# Patient Record
Sex: MALE | Race: WHITE | NOT HISPANIC OR LATINO | URBAN - METROPOLITAN AREA
[De-identification: names, ages, dates, MRNs, and addresses within clinical notes are randomized per-mention and may not be internally consistent; named-entity substitution may affect disease eponyms.]

---

## 2024-10-23 NOTE — TELEPHONE ENCOUNTER
Caller: Jayde Padron    Relationship: Emergency Contact    Best call back number: [phone]     Requested Prescriptions:   Requested Prescriptions     Pending Prescriptions Disp Refills    predniSONE (DELTASONE) 1 MG tablet       Sig: Take 1 tablet by mouth Daily. 3 TAB DAILY W/FOOD AS DIRECTED        Pharmacy where request should be sent: [facility] [address] [place] [address] [phone]  - [phone] FX     Last office visit with prescribing clinician: Visit date not found   Last telemedicine visit with prescribing clinician: Visit date not found   Next office visit with prescribing clinician: 12/12/2024     Additional details provided by patient: WILL BE OUT WITHIN THE WEEK. HAS SCHEDULED F/U WITH [facility].     Does the patient have less than a 3 day supply:  [] Yes  [x] No    Would you like a call back once the refill request has been completed: [] Yes [x] No    Ursula Melton Rep   10/23/24 13:04 EDT

## 2024-10-24 RX ORDER — PREDNISONE 1 MG/1
3 TABLET ORAL DAILY
Qty: 270 TABLET | Refills: 0 | Status: SHIPPED | OUTPATIENT
Start: 2024-10-24

## 2024-12-04 ENCOUNTER — TELEPHONE (OUTPATIENT)
Age: 64
End: 2024-12-04
Payer: COMMERCIAL

## 2024-12-04 NOTE — TELEPHONE ENCOUNTER
Caller: Jayde Padron    Relationship to patient: Emergency Contact    Best call back number: [phone]    Patient is needing: PT MISPLACED HIS PAPERWORK TO GET HIS LABS DONE AND NEEDS TO GET IT BEFORE HIS APPT NEXT THURSDAY. HIS WIFE ASKED IF IT COULD BE EMAILED TO HIM, IF NOT. BY MAIL.

## 2024-12-05 NOTE — TELEPHONE ENCOUNTER
Called and notified patient's wife, the patient will need to be seen in order to receive lab order (per [facility], best to wait until appt in case the provider need to add any add'tl labs). Patient's wife seem dissatisfied and stated Dr. Olivier always order labs prior to next appt. Expressed to patient's wife the patient's appts were cancelled on a total of three times. She stated one out of the three appts we've cancelled on our behalf.

## 2024-12-10 PROBLEM — M54.50 CHRONIC LOW BACK PAIN: Status: ACTIVE | Noted: 2024-12-10

## 2024-12-10 PROBLEM — Z79.899 IMMUNODEFICIENCY DUE TO TREATMENT WITH IMMUNOSUPPRESSIVE MEDICATION: Status: ACTIVE | Noted: 2024-12-10

## 2024-12-10 PROBLEM — Z79.60 IMMUNODEFICIENCY DUE TO TREATMENT WITH IMMUNOSUPPRESSIVE MEDICATION: Status: ACTIVE | Noted: 2024-12-10

## 2024-12-10 PROBLEM — L40.50 PSORIATIC ARTHRITIS: Status: ACTIVE | Noted: 2024-12-10

## 2024-12-10 PROBLEM — Z79.52 LONG TERM CURRENT USE OF SYSTEMIC STEROIDS: Status: ACTIVE | Noted: 2024-12-10

## 2024-12-10 PROBLEM — M54.2 NECK PAIN: Status: ACTIVE | Noted: 2024-12-10

## 2024-12-10 PROBLEM — G89.29 CHRONIC PAIN OF BOTH SHOULDERS: Status: ACTIVE | Noted: 2024-12-10

## 2024-12-10 PROBLEM — M25.511 CHRONIC PAIN OF BOTH SHOULDERS: Status: ACTIVE | Noted: 2024-12-10

## 2024-12-10 PROBLEM — G89.29 CHRONIC LOW BACK PAIN: Status: ACTIVE | Noted: 2024-12-10

## 2024-12-10 PROBLEM — T45.1X5A IMMUNODEFICIENCY DUE TO TREATMENT WITH IMMUNOSUPPRESSIVE MEDICATION: Status: ACTIVE | Noted: 2024-12-10

## 2024-12-10 PROBLEM — M25.512 CHRONIC PAIN OF BOTH SHOULDERS: Status: ACTIVE | Noted: 2024-12-10

## 2024-12-10 PROBLEM — D84.821 IMMUNODEFICIENCY DUE TO TREATMENT WITH IMMUNOSUPPRESSIVE MEDICATION: Status: ACTIVE | Noted: 2024-12-10

## 2024-12-10 NOTE — PROGRESS NOTES
Office Visit       Date: 12/12/2024   Patient Name: Georeg Padron  MRN: [id number]  YOB: 1960    Referring Physician: No ref. provider found     Chief Complaint:   Chief Complaint   Patient presents with    Psoriatic Arthritis       History of Present Illness: George Padron is a 64 y.o. male who is here today for follow-up of psoriatic arthritis.    He was last seen in clinic 2/15/2024 by myself.    He has been off Humira since 6/1/23. He has been feeling more poorly for the last 5-6 weeks. He reports he feels like he did when he was first diagnosed with his PSA. He is having more pain in his toes and feet. He reports sensation of swelling in the balls of his right foot, but no overt swelling to the eye. No dactylitis.  He is hesitant to start back on DMARD or biologic therapy.  Continues on 3 mg of prednisone.   No current skin lesions.   He takes naproxen PRN, typically just once daily.  He is walking 3-5 miles daily.   He has chronic neck and back pain that is degenerative in nature. He has tried PT and injections. He was told he could try cervical surgery but has declined.    Subjective   Review of systems:  Positive for back pain, neck pain and stiffness, sleep disturbance.  Otherwise negative ROS.    Past Medical History:   Past Medical History:   Diagnosis Date    Psoriasis        Past Surgical History:   Past Surgical History:   Procedure Laterality Date    APPENDECTOMY      CATARACT EXTRACTION, BILATERAL         Family History:   Family History   Problem Relation Age of Onset    Breast cancer Mother     Heart disease Father     Lymphoma Father     Breast cancer Sister        Social History:   Social History     Socioeconomic History    Marital status:    Tobacco Use    Smoking status: Never    Smokeless tobacco: Never   Vaping Use    Vaping status: Never Used   Substance and Sexual Activity    Alcohol use: Yes     Comment: OCC    Drug use: Defer    Sexual activity:  "Defer       Medications:   Current Outpatient Medications:     BIOTIN PO, Take 1 capsule by mouth Daily., Disp: , Rfl:     Cholecalciferol (Vitamin D3) 50 MCG (2000 UT) tablet, Take 1 tablet by mouth Daily., Disp: , Rfl:     famotidine (PEPCID) 40 MG tablet, Take 1 tablet by mouth At Night As Needed for Heartburn., Disp: , Rfl:     MILK THISTLE PO, Take 1 capsule by mouth Daily., Disp: , Rfl:     Omeprazole Magnesium (PRILOSEC PO), Take 20 mg by mouth Daily., Disp: , Rfl:     predniSONE (DELTASONE) 1 MG tablet, Take 3 tablets by mouth Daily., Disp: 270 tablet, Rfl: 0    celecoxib (CeleBREX) 200 MG capsule, Take 1 capsule by mouth 2 (Two) Times a Day., Disp: 180 capsule, Rfl: 1    Allergies:   Allergies   Allergen Reactions    Codeine Provider Review Needed       I reviewed the patient's chief complaint, history of present illness, review of systems, past medical history, surgical history, family history, social history, medications and allergy list.     Objective    Vital Signs:   Vitals:    12/12/24 1403   BP: 152/80   Pulse: 68   Temp: 98.7 °F (37.1 °C)   Weight: 94.4 kg (208 lb 3.2 oz)   Height: 182.9 cm (72\")   PainSc:   5   PainLoc: Foot  Comment: bilateral.     Body mass index is 28.24 kg/m².   Defer to PCP    Physical Exam:  Physical Exam  Constitutional:       Appearance: Normal appearance.   HENT:      Head: Normocephalic and atraumatic.   Eyes:      Conjunctiva/sclera: Conjunctivae normal.      Pupils: Pupils are equal, round, and reactive to light.   Cardiovascular:      Rate and Rhythm: Normal rate and regular rhythm.      Heart sounds: Normal heart sounds.   Pulmonary:      Effort: Pulmonary effort is normal.      Breath sounds: Normal breath sounds.   Musculoskeletal:         General: Normal range of motion.      Cervical back: Normal range of motion.   Skin:     General: Skin is warm and dry.   Neurological:      General: No focal deficit present.      Mental Status: He is alert and oriented to " person, place, and time.   Psychiatric:         Mood and Affect: Mood normal.         Behavior: Behavior normal.         Thought Content: Thought content normal.         Judgment: Judgment normal.              Metrics  BOONE-28 (ESR): --  BOONE-28 (CRP): --  Tender (BOONE-28): 0 / 28   Swollen (BOONE-28): 0 / 28     This Exam  Provider Global: 30 / 100  Patient Global: 60 / 100  ESR: --  CRP: --          Assessment / Plan    Assessment/Plan:   Diagnoses and all orders for this visit:    1. Psoriatic arthritis (Primary)  Assessment & Plan:  Onset of pain 2010.  RF, CCP, NAUN, and HLA B27 negative. Leflunomide no help. Sulfasalazine 2010.  Sulfasalazine was discontinued and methotrexate started 2010. Enbrel added 2017. Chronic steroid therapy.  First seen at [facility] 2017.   Humira started 2018 after stopping Enbrel. MTX stopped 2019. Humira stopped 6/2023 (cost).    PsA seems to be doing worse. Having 2-3 hours a day of morning stiffness.  Having pain in his right MTPs.  Tender joint count is 0, swollen joint count is 0 , physician global is 3, visual analog pain scale is 5, pt global is 6  On examination, he demonstrates no joint swelling, enthesitis, or dactylitis.  He stopped Humira 6/2023 due to cost and has been off medication for PSA since that time.  He would like to continue to monitor off biologics/DMARDs for now.  He has currently been taking naproxen once daily. We will try stopping naproxen and trial Celebrex BID PRN.  Continue 3 mg of prednisone.   If he is having persistent MTP pain, we did discuss adding medication for his psoriatic at his next visit.  Labs today  RTC in 3-4 months    Orders:  -     predniSONE (DELTASONE) 1 MG tablet; Take 3 tablets by mouth Daily.  Dispense: 270 tablet; Refill: 0  -     Comprehensive Metabolic Panel; Future  -     C-reactive Protein; Future  -     Sedimentation Rate; Future  -     CBC Auto Differential; Future  -     Hepatitis Panel, Acute; Future  -     QuantiFERON-TB Gold Plus;  Future  -     celecoxib (CeleBREX) 200 MG capsule; Take 1 capsule by mouth 2 (Two) Times a Day.  Dispense: 180 capsule; Refill: 1    2. 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 Off Humira since 6/2023    Orders:  -     Hepatitis Panel, Acute; Future  -     QuantiFERON-TB Gold Plus; Future    4. Long term current use of systemic steroids  Assessment & Plan:  Prednisone 10 mg q. a.m. started 2010.     Has since weaned to 3 mg daily which he continues    Orders:  -     predniSONE (DELTASONE) 1 MG tablet; Take 3 tablets by mouth Daily.  Dispense: 270 tablet; Refill: 0  -     Comprehensive Metabolic Panel; Future  -     C-reactive Protein; Future  -     Sedimentation Rate; Future  -     CBC Auto Differential; Future    5. Chronic pain of both shoulders  Assessment & Plan:  He has a known history of rotator cuff tear bilaterally. NCS shows cervical radiculopathy. PT helped. I think neck pain is associated with DDD. Injections gave some relief but short term only and symptoms come and go    Orders:  -     celecoxib (CeleBREX) 200 MG capsule; Take 1 capsule by mouth 2 (Two) Times a Day.  Dispense: 180 capsule; Refill: 1    6. Chronic low back pain, unspecified back pain laterality, unspecified whether sciatica present  Assessment & Plan:  Recommend discuss medication options with PCP, such as muscle relaxer, gabapentin  PT/stretching has been helpful in past  Pain in LS area on R, R groin, and down to foot with paresthesia. Worse with back movement.   Injection gave some relief. Stretching can relieve it.    [place] does [place] in [place] when he was not working.  Symptoms still come and go    Orders:  -     celecoxib (CeleBREX) 200 MG capsule; Take 1 capsule by mouth 2 (Two) Times a Day.   Dispense: 180 capsule; Refill: 1    7. Neck pain  Assessment & Plan:  Comes and goes. Known DDD with cervical radiculitis in the past. PT and injections have not helped much. He declined surgical options discussed with NS    Orders:  -     celecoxib (CeleBREX) 200 MG capsule; Take 1 capsule by mouth 2 (Two) Times a Day.  Dispense: 180 capsule; Refill: 1    8. Encounter for medication monitoring  Assessment & Plan:  Update hepatitis panel    Orders:  -     Comprehensive Metabolic Panel; Future  -     C-reactive Protein; Future  -     Sedimentation Rate; Future  -     CBC Auto Differential; Future  -     Hepatitis Panel, Acute; Future  -     QuantiFERON-TB Gold Plus; Future    9. Fatigue, unspecified type  Assessment & Plan:  Update QTB    Orders:  -     Hepatitis Panel, Acute; Future  -     QuantiFERON-TB Gold Plus; Future        Follow Up:   Return in about 4 months (around 4/12/2025) for Dr. Olivier.        UMA Valencia  [facility] [place]

## 2024-12-10 NOTE — ASSESSMENT & PLAN NOTE
Enbrel therapy started February 2017-stopped 2018  Humira started 2018- stopped on own 6/2023    No significant infections in interim. Off Humira since 6/2023

## 2024-12-10 NOTE — ASSESSMENT & PLAN NOTE
Recommend discuss medication options with PCP, such as muscle relaxer, gabapentin  PT/stretching has been helpful in past  Pain in LS area on R, R groin, and down to foot with paresthesia. Worse with back movement.   Injection gave some relief. Stretching can relieve it.    [place] does [place] in [place] when he was not working.  Symptoms still come and go   family/patient

## 2024-12-10 NOTE — ASSESSMENT & PLAN NOTE
Comes and goes. Known DDD with cervical radiculitis in the past. PT and injections have not helped much. He declined surgical options discussed with NS   Retinal tear and detachment warning symptoms reviewed and patient instructed to call immediately if increasing floaters, flashes, or decreasing peripheral vision.

## 2024-12-10 NOTE — ASSESSMENT & PLAN NOTE
He has a known history of rotator cuff tear bilaterally. NCS shows cervical radiculopathy. PT helped. I think neck pain is associated with DDD. Injections gave some relief but short term only and symptoms come and go

## 2024-12-10 NOTE — ASSESSMENT & PLAN NOTE
Onset of pain 2010.  RF, CCP, NAUN, and HLA B27 negative. Leflunomide no help. Sulfasalazine 2010.  Sulfasalazine was discontinued and methotrexate started 2010. Enbrel added 2017. Chronic steroid therapy.  First seen at [facility] 2017.   Humira started 2018 after stopping Enbrel. MTX stopped 2019. Humira stopped 6/2023 (cost).    PsA seems to be doing worse. Having 2-3 hours a day of morning stiffness.  Having pain in his right MTPs.  Tender joint count is 0, swollen joint count is 0 , physician global is 3, visual analog pain scale is 5, pt global is 6  On examination, he demonstrates no joint swelling, enthesitis, or dactylitis.  He stopped Humira 6/2023 due to cost and has been off medication for PSA since that time.  He would like to continue to monitor off biologics/DMARDs for now.  He has currently been taking naproxen once daily. We will try stopping naproxen and trial Celebrex BID PRN.  Continue 3 mg of prednisone.   If he is having persistent MTP pain, we did discuss adding medication for his psoriatic at his next visit.  Labs today  RTC in 3-4 months

## 2024-12-12 ENCOUNTER — LAB (OUTPATIENT)
Facility: HOSPITAL | Age: 64
End: 2024-12-12
Payer: MEDICARE

## 2024-12-12 ENCOUNTER — OFFICE VISIT (OUTPATIENT)
Age: 64
End: 2024-12-12
Payer: MEDICARE

## 2024-12-12 VITALS
TEMPERATURE: 98.7 F | SYSTOLIC BLOOD PRESSURE: 152 MMHG | BODY MASS INDEX: 28.2 KG/M2 | HEART RATE: 68 BPM | WEIGHT: 208.2 LBS | DIASTOLIC BLOOD PRESSURE: 80 MMHG | HEIGHT: 72 IN

## 2024-12-12 DIAGNOSIS — G89.29 CHRONIC LOW BACK PAIN, UNSPECIFIED BACK PAIN LATERALITY, UNSPECIFIED WHETHER SCIATICA PRESENT: ICD-10-CM

## 2024-12-12 DIAGNOSIS — L40.50 PSORIATIC ARTHRITIS: Primary | ICD-10-CM

## 2024-12-12 DIAGNOSIS — R53.83 FATIGUE, UNSPECIFIED TYPE: ICD-10-CM

## 2024-12-12 DIAGNOSIS — Z79.899 IMMUNODEFICIENCY DUE TO TREATMENT WITH IMMUNOSUPPRESSIVE MEDICATION: ICD-10-CM

## 2024-12-12 DIAGNOSIS — D84.821 IMMUNODEFICIENCY DUE TO TREATMENT WITH IMMUNOSUPPRESSIVE MEDICATION: ICD-10-CM

## 2024-12-12 DIAGNOSIS — L40.9 PSORIASIS: ICD-10-CM

## 2024-12-12 DIAGNOSIS — G89.29 CHRONIC PAIN OF BOTH SHOULDERS: ICD-10-CM

## 2024-12-12 DIAGNOSIS — M25.512 CHRONIC PAIN OF BOTH SHOULDERS: ICD-10-CM

## 2024-12-12 DIAGNOSIS — L40.50 PSORIATIC ARTHRITIS: ICD-10-CM

## 2024-12-12 DIAGNOSIS — M54.50 CHRONIC LOW BACK PAIN, UNSPECIFIED BACK PAIN LATERALITY, UNSPECIFIED WHETHER SCIATICA PRESENT: ICD-10-CM

## 2024-12-12 DIAGNOSIS — Z79.52 LONG TERM CURRENT USE OF SYSTEMIC STEROIDS: ICD-10-CM

## 2024-12-12 DIAGNOSIS — Z51.81 ENCOUNTER FOR MEDICATION MONITORING: ICD-10-CM

## 2024-12-12 DIAGNOSIS — M25.511 CHRONIC PAIN OF BOTH SHOULDERS: ICD-10-CM

## 2024-12-12 DIAGNOSIS — M54.2 NECK PAIN: ICD-10-CM

## 2024-12-12 LAB
BASOPHILS # BLD AUTO: 0.04 10*3/MM3 (ref 0–0.2)
BASOPHILS NFR BLD AUTO: 0.5 % (ref 0–1.5)
DEPRECATED RDW RBC AUTO: 39.2 FL (ref 37–54)
EOSINOPHIL # BLD AUTO: 0.04 10*3/MM3 (ref 0–0.4)
EOSINOPHIL NFR BLD AUTO: 0.5 % (ref 0.3–6.2)
ERYTHROCYTE [DISTWIDTH] IN BLOOD BY AUTOMATED COUNT: 12.1 % (ref 12.3–15.4)
ERYTHROCYTE [SEDIMENTATION RATE] IN BLOOD: 7 MM/HR (ref 0–20)
HCT VFR BLD AUTO: 46.1 % (ref 37.5–51)
HGB BLD-MCNC: 16.2 G/DL (ref 13–17.7)
IMM GRANULOCYTES # BLD AUTO: 0.02 10*3/MM3 (ref 0–0.05)
IMM GRANULOCYTES NFR BLD AUTO: 0.3 % (ref 0–0.5)
LYMPHOCYTES # BLD AUTO: 2.02 10*3/MM3 (ref 0.7–3.1)
LYMPHOCYTES NFR BLD AUTO: 26 % (ref 19.6–45.3)
MCH RBC QN AUTO: 31.4 PG (ref 26.6–33)
MCHC RBC AUTO-ENTMCNC: 35.1 G/DL (ref 31.5–35.7)
MCV RBC AUTO: 89.3 FL (ref 79–97)
MONOCYTES # BLD AUTO: 0.47 10*3/MM3 (ref 0.1–0.9)
MONOCYTES NFR BLD AUTO: 6 % (ref 5–12)
NEUTROPHILS NFR BLD AUTO: 5.19 10*3/MM3 (ref 1.7–7)
NEUTROPHILS NFR BLD AUTO: 66.7 % (ref 42.7–76)
NRBC BLD AUTO-RTO: 0 /100 WBC (ref 0–0.2)
PLATELET # BLD AUTO: 251 10*3/MM3 (ref 140–450)
PMV BLD AUTO: 9.8 FL (ref 6–12)
RBC # BLD AUTO: 5.16 10*6/MM3 (ref 4.14–5.8)
WBC NRBC COR # BLD AUTO: 7.78 10*3/MM3 (ref 3.4–10.8)

## 2024-12-12 PROCEDURE — 86480 TB TEST CELL IMMUN MEASURE: CPT

## 2024-12-12 PROCEDURE — 86140 C-REACTIVE PROTEIN: CPT

## 2024-12-12 PROCEDURE — 80074 ACUTE HEPATITIS PANEL: CPT

## 2024-12-12 PROCEDURE — 36415 COLL VENOUS BLD VENIPUNCTURE: CPT

## 2024-12-12 PROCEDURE — 85025 COMPLETE CBC W/AUTO DIFF WBC: CPT

## 2024-12-12 PROCEDURE — 85652 RBC SED RATE AUTOMATED: CPT

## 2024-12-12 PROCEDURE — 80053 COMPREHEN METABOLIC PANEL: CPT

## 2024-12-12 RX ORDER — PREDNISONE 1 MG/1
3 TABLET ORAL DAILY
Qty: 270 TABLET | Refills: 0 | Status: SHIPPED | OUTPATIENT
Start: 2024-12-12

## 2024-12-12 RX ORDER — CELECOXIB 200 MG/1
200 CAPSULE ORAL 2 TIMES DAILY
Qty: 180 CAPSULE | Refills: 1 | Status: SHIPPED | OUTPATIENT
Start: 2024-12-12

## 2024-12-12 RX ORDER — NAPROXEN 500 MG/1
500 TABLET ORAL 2 TIMES DAILY WITH MEALS
Qty: 60 TABLET | Refills: 3 | Status: CANCELLED | OUTPATIENT
Start: 2024-12-12

## 2024-12-12 RX ORDER — ADALIMUMAB 40MG/0.8ML
KIT SUBCUTANEOUS
COMMUNITY
End: 2024-12-12

## 2024-12-13 LAB
ALBUMIN SERPL-MCNC: 4.5 G/DL (ref 3.5–5.2)
ALBUMIN/GLOB SERPL: 1.6 G/DL
ALP SERPL-CCNC: 94 U/L (ref 39–117)
ALT SERPL W P-5'-P-CCNC: 39 U/L (ref 1–41)
ANION GAP SERPL CALCULATED.3IONS-SCNC: 11 MMOL/L (ref 5–15)
AST SERPL-CCNC: 31 U/L (ref 1–40)
BILIRUB SERPL-MCNC: 0.7 MG/DL (ref 0–1.2)
BUN SERPL-MCNC: 19 MG/DL (ref 8–23)
BUN/CREAT SERPL: 20.2 (ref 7–25)
CALCIUM SPEC-SCNC: 10 MG/DL (ref 8.6–10.5)
CHLORIDE SERPL-SCNC: 104 MMOL/L (ref 98–107)
CO2 SERPL-SCNC: 26 MMOL/L (ref 22–29)
CREAT SERPL-MCNC: 0.94 MG/DL (ref 0.76–1.27)
CRP SERPL-MCNC: 0.48 MG/DL (ref 0–0.5)
EGFRCR SERPLBLD CKD-EPI 2021: 90 ML/MIN/1.73
GLOBULIN UR ELPH-MCNC: 2.9 GM/DL
GLUCOSE SERPL-MCNC: 121 MG/DL (ref 65–99)
HAV IGM SERPL QL IA: NORMAL
HBV CORE IGM SERPL QL IA: NORMAL
HBV SURFACE AG SERPL QL IA: NORMAL
HCV AB SER QL: NORMAL
POTASSIUM SERPL-SCNC: 4 MMOL/L (ref 3.5–5.2)
PROT SERPL-MCNC: 7.4 G/DL (ref 6–8.5)
SODIUM SERPL-SCNC: 141 MMOL/L (ref 136–145)

## 2024-12-17 LAB
GAMMA INTERFERON BACKGROUND BLD IA-ACNC: 0.02 IU/ML
M TB IFN-G BLD-IMP: NEGATIVE
M TB IFN-G CD4+ BCKGRND COR BLD-ACNC: 0.03 IU/ML
M TB IFN-G CD4+CD8+ BCKGRND COR BLD-ACNC: 0.02 IU/ML
MITOGEN IGNF BCKGRD COR BLD-ACNC: >10 IU/ML
QUANTIFERON INCUBATION: NORMAL
SERVICE CMNT-IMP: NORMAL

## 2025-03-10 NOTE — PROGRESS NOTES
Office Follow Up      Date: 03/11/2025   Patient Name: George Padron  MRN: [id number]  YOB: 1960    Referring Physician: No ref. provider found     Chief Complaint: Psoriatic arthritis      History of Present Illness: George Padron is a 64 y.o. male who is here today for follow up on psoriatic arthritis  Since October 2024, he has increased pain diffusely. More pain in ribs and spine. Pain in L 3rd MCP. .  Feet feel bruised. Some burning sensation in feet. Having numbness in L foot.   Continues on 3 mg of prednisone. No current skin lesions. He takes naproxen PRN, typically just once daily.  He has known cervical radicular disease.     He has been off Humira since 6/1/23. He feels well off the medication and would like to continue off biologics/DMARDs for now and monitor. No infections. No joint swelling/tenderness. Most of his pain is from his neck and back and is degenerative in nature. He has tried PT and injections. He was told he could try cervical surgery but has declined.  Pain scale: 9/10. EMS is all day.   The symptoms are constant. The problem is improving. The primary symptoms reported include: stiffness. The following symptoms are not reported:  pain, swelling and functional limitation. The patient's assessment of treatment is: helping greatly. The patient is not experiencing side effects. The locations affected since last visit are neck, low back and bilateral shoulder. Associated symptoms include AM stiffness (all day) and inactivity gelling. Pertinent negatives include fever, fatigue, change in vision, sicca complaints, mouth sores/lesions, skin lesion(s), chest pain, changing cough, edema, joint swelling and abdominal pain.      Subjective   Review of Systems: Review of Systems   Musculoskeletal:  Positive for arthralgias, back pain, gait problem, joint swelling, myalgias, neck pain and neck stiffness.        Medications:   Current Outpatient Medications:     BIOTIN  "PO, Take 1 capsule by mouth Daily., Disp: , Rfl:     Cholecalciferol (Vitamin D3) 50 MCG (2000 UT) tablet, Take 1 tablet by mouth Daily., Disp: , Rfl:     famotidine (PEPCID) 40 MG tablet, Take 1 tablet by mouth At Night As Needed for Heartburn., Disp: , Rfl:     MILK THISTLE PO, Take 1 capsule by mouth Daily., Disp: , Rfl:     Omeprazole Magnesium (PRILOSEC PO), Take 20 mg by mouth Daily., Disp: , Rfl:     predniSONE (DELTASONE) 1 MG tablet, Take 3 tablets by mouth Daily., Disp: 270 tablet, Rfl: 0    naproxen (Naprosyn) 500 MG tablet, Take 1 tablet by mouth 2 (Two) Times a Day With Meals. Take 1 tablet (500 mg) by oral route 2 times every day with food, Disp: 60 tablet, Rfl: 6    Allergies:   Allergies   Allergen Reactions    Codeine Provider Review Needed       I have reviewed and updated the patient's chief complaint, history of present illness, review of systems, past medical history, surgical history, family history, social history, medications and allergy list as appropriate.     Objective    Vital Signs:   Vitals:    03/11/25 1348   BP: 142/78   BP Location: Left arm   Patient Position: Sitting   Cuff Size: Adult   Pulse: 61   Temp: 97.8 °F (36.6 °C)   Weight: 93.2 kg (205 lb 6.4 oz)   Height: 182.9 cm (72\")   PainSc: 9    PainLoc: Generalized     Body mass index is 27.86 kg/m².    Physical Exam:  GEN.: The patient is pleasant, cooperative, well-developed and healthy-appearing.  The patient is in no obvious distress and is alert and oriented ×3.  HEENT: Normocephalic and atraumatic.  No alopecia.  No facial rash.  Sclera and conjunctiva are clear.  Pupils are equal and reactive.  Fundi are benign.  Dentition is in fair repair.  There are no oral or nasal lesions seen.  NECK: Supple without adenopathy or thyromegaly.  No masses are felt.  LUNGS: Clear to auscultation and percussion.  Effort is normal.  CARDIAC: Normal S1 and S2 with a regular rate and rhythm without murmur, gallop, or rub.  ABDOMEN: Not " examined.  EXTREMITIES: There is no cyanosis, clubbing, or edema.   SKIN: Warm and dry without rashes, telangiectasias, vasculitic lesions, psoriatic lesions, nail pits, or nodules.  JOINTS: There is full range of motion of the shoulders, elbows, wrists, and hands without soft tissue swelling, synovitis, or deformities.  There is no soft tissue swelling or synovitis in the hands.  Hips have good flexion and internal and external rotation with pain. He has back pain with full flexion of R hip and with full external rotation.   Knees have no palpable effusions.  Ankles have no soft tissue swelling, synovitis, or significant deformities.    BACK: Straight without scoliosis.  NEUROLOGIC:   Gait and station are normal.  Joint Exam 03/11/2025        Right  Left   MCP 3     Swollen Tender   MTP 2  Swollen Tender      MTP 3  Swollen Tender        The following joints were examined and normal:   Left TMJ, Right TMJ, Left Sternoclavicular, Right Sternoclavicular, Left Acromioclavicular, Right Acromioclavicular, Left Glenohumeral, Right Glenohumeral, Left Elbow, Right Elbow, Left Wrist, Right Wrist, Left MCP 1, Right MCP 1, Left MCP 2, Right MCP 2, Right MCP 3, Left MCP 4, Right MCP 4, Left MCP 5, Right MCP 5, Left IP (thumb), Right IP (thumb), Left PIP 2 (finger), Right PIP 2 (finger), Left PIP 3 (finger), Right PIP 3 (finger), Left PIP 4 (finger), Right PIP 4 (finger), Left PIP 5 (finger), Right PIP 5 (finger), Left DIP 2 (finger), Right DIP 2 (finger), Left DIP 3 (finger), Right DIP 3 (finger), Left DIP 4 (finger), Right DIP 4 (finger), Left DIP 5 (finger), Right DIP 5 (finger), Left Hip, Right Hip, Left Knee, Right Knee, Left Ankle, Right Ankle, Left Tarsometatarsal, Right Tarsometatarsal, Left MTP 1, Right MTP 1, Left MTP 2, Left MTP 3, Left MTP 4, Right MTP 4, Left MTP 5, Right MTP 5, Left IP (toe), Right IP (toe), Left PIP 2 (toe), Right PIP 2 (toe), Left PIP 3 (toe), Right PIP 3 (toe), Left PIP 4 (toe), Right PIP 4  (toe), Left PIP 5 (toe), Right PIP 5 (toe)       Patient Global: 90 / 100  Provider Global: 30 / 100      Assessment / Plan      Assessment & Plan  Psoriatic arthritis      Orders:    C-reactive Protein; Future    Sedimentation Rate; Future    Psoriasis           Immunodeficiency due to treatment with immunosuppressive medication      Orders:    XR Chest 2 View    Long term current use of systemic steroids           Chronic pain of both shoulders         Chronic low back pain, unspecified back pain laterality, unspecified whether sciatica present         Neck pain           Encounter for medication monitoring         Fatigue, unspecified type      Orders:    XR Chest 2 View    Long term (current) use of non-steroidal anti-inflammatories (nsaid)    Orders:    Comprehensive Metabolic Panel; Future    CBC Auto Differential; Future     Psoriatic arthritis  Onset of pain 2010.  RF, CCP, NAUN, and HLA B27 negative. Leflunomide no help. Sulfasalazine 2010.  Sulfasalazine was discontinued and methotrexate started 2010. Enbrel added 2017. Chronic steroid therapy.  First seen at [facility] 2017.   Humira started 2018 after stopping Enbrel. MTX stopped 2019. Humira stopped 6/2023 (cost).    PsA is worse. Having morning stiffness.  Having pain in his right MTPs and L hand with swelling.   Tender joint count is 3, swollen joint count is 3 , physician global is 3, visual analog pain scale is 9, pt global is 8  He stopped Humira 6/2023 due to cost and has been off medication for PSA since that time.  I will PA Humira.  We discussed biologic agents at length. Risks and alternative were discussed at length and the option of no treatment was also given. We discussed risks including but not limited to infections which can be unusual,  severe, and deadly. When possible, these agents should be stopped immediately if infections occur. Unusual infection such as TB and fungal infections can occur. There may be an increased risk of lymphoma with  these agents. Other risks can include are multiple sclerosis like illness and worsening of the failure. Infusion or injection reactions whish can be delay have been reported.  Reactivation of deadly brain viruses have been reported.  Worsening of COPD has been seen with Orencia.  Elevated lipids, elevations in liver functions, and dangerous changes in blood counts have been seen with certain agents.  Regular monitoring will be required.  Continue 3 mg of prednisone.   Labs today  RTC in 3 months         Psoriasis  Diagnosed by bx    Doing well         Immunodeficiency due to treatment with immunosuppressive medication  Enbrel therapy started February 2017-stopped 2018  Humira started 2018- stopped on own 6/2023    No significant infections in interim. Off Humira since 6/2023         Long term current use of systemic steroids  Prednisone 10 mg q. a.m. started 2010.     Has since weaned to 3 mg daily which he continues         Chronic pain of both shoulders  He has a known history of rotator cuff tear bilaterally. NCS shows cervical radiculopathy. PT helped. I think neck pain is associated with DDD. Injections gave some relief but short term only and symptoms come and go         Chronic low back pain, unspecified back pain laterality, unspecified whether sciatica present  Recommend discuss medication options with PCP, such as muscle relaxer, gabapentin  PT/stretching has been helpful in past  Pain in LS area on R, R groin, and down to foot with paresthesia. Worse with back movement.   Injection gave some relief. Stretching can relieve it.    Symptoms still come and go         Neck pain  Comes and goes. Known DDD with cervical radiculitis in the past. PT and injections have not helped much. He declined surgical options discussed with NS         Encounter for medication monitoring       Long term (current) use of non-steroidal anti-inflammatories (nsaid)  Risks of nonsteroidal anti-inflammatory drugs discussed including  GI upset, GI bleeding, renal and hepatic risk, and the risks of cardiovascular disease.  Warned not to take with other NSAIDs including over-the-counter NSAIDs.      Fatigue, unspecified type      Follow Up:   Return in about 3 months (around 6/11/2025).        George Olivier MD  [facility]

## 2025-03-11 ENCOUNTER — TELEPHONE (OUTPATIENT)
Age: 65
End: 2025-03-11

## 2025-03-11 ENCOUNTER — LAB (OUTPATIENT)
Facility: HOSPITAL | Age: 65
End: 2025-03-11
Payer: MEDICARE

## 2025-03-11 ENCOUNTER — OFFICE VISIT (OUTPATIENT)
Age: 65
End: 2025-03-11
Payer: MEDICARE

## 2025-03-11 VITALS
TEMPERATURE: 97.8 F | HEART RATE: 61 BPM | SYSTOLIC BLOOD PRESSURE: 142 MMHG | BODY MASS INDEX: 27.82 KG/M2 | WEIGHT: 205.4 LBS | DIASTOLIC BLOOD PRESSURE: 78 MMHG | HEIGHT: 72 IN

## 2025-03-11 DIAGNOSIS — Z79.52 LONG TERM CURRENT USE OF SYSTEMIC STEROIDS: ICD-10-CM

## 2025-03-11 DIAGNOSIS — M25.511 CHRONIC PAIN OF BOTH SHOULDERS: ICD-10-CM

## 2025-03-11 DIAGNOSIS — Z79.899 IMMUNODEFICIENCY DUE TO TREATMENT WITH IMMUNOSUPPRESSIVE MEDICATION: ICD-10-CM

## 2025-03-11 DIAGNOSIS — G89.29 CHRONIC LOW BACK PAIN, UNSPECIFIED BACK PAIN LATERALITY, UNSPECIFIED WHETHER SCIATICA PRESENT: ICD-10-CM

## 2025-03-11 DIAGNOSIS — L40.9 PSORIASIS: ICD-10-CM

## 2025-03-11 DIAGNOSIS — Z51.81 ENCOUNTER FOR MEDICATION MONITORING: ICD-10-CM

## 2025-03-11 DIAGNOSIS — R53.83 FATIGUE, UNSPECIFIED TYPE: ICD-10-CM

## 2025-03-11 DIAGNOSIS — Z79.1 LONG TERM (CURRENT) USE OF NON-STEROIDAL ANTI-INFLAMMATORIES (NSAID): ICD-10-CM

## 2025-03-11 DIAGNOSIS — D84.821 IMMUNODEFICIENCY DUE TO TREATMENT WITH IMMUNOSUPPRESSIVE MEDICATION: ICD-10-CM

## 2025-03-11 DIAGNOSIS — L40.50 PSORIATIC ARTHRITIS: Primary | ICD-10-CM

## 2025-03-11 DIAGNOSIS — M54.2 NECK PAIN: ICD-10-CM

## 2025-03-11 DIAGNOSIS — L40.50 PSORIATIC ARTHRITIS: ICD-10-CM

## 2025-03-11 DIAGNOSIS — M25.512 CHRONIC PAIN OF BOTH SHOULDERS: ICD-10-CM

## 2025-03-11 DIAGNOSIS — G89.29 CHRONIC PAIN OF BOTH SHOULDERS: ICD-10-CM

## 2025-03-11 DIAGNOSIS — M54.50 CHRONIC LOW BACK PAIN, UNSPECIFIED BACK PAIN LATERALITY, UNSPECIFIED WHETHER SCIATICA PRESENT: ICD-10-CM

## 2025-03-11 LAB
BASOPHILS # BLD AUTO: 0.04 10*3/MM3 (ref 0–0.2)
BASOPHILS NFR BLD AUTO: 0.5 % (ref 0–1.5)
DEPRECATED RDW RBC AUTO: 40.7 FL (ref 37–54)
EOSINOPHIL # BLD AUTO: 0.06 10*3/MM3 (ref 0–0.4)
EOSINOPHIL NFR BLD AUTO: 0.7 % (ref 0.3–6.2)
ERYTHROCYTE [DISTWIDTH] IN BLOOD BY AUTOMATED COUNT: 12.4 % (ref 12.3–15.4)
HCT VFR BLD AUTO: 43.6 % (ref 37.5–51)
HGB BLD-MCNC: 14.7 G/DL (ref 13–17.7)
IMM GRANULOCYTES # BLD AUTO: 0.03 10*3/MM3 (ref 0–0.05)
IMM GRANULOCYTES NFR BLD AUTO: 0.3 % (ref 0–0.5)
LYMPHOCYTES # BLD AUTO: 1.64 10*3/MM3 (ref 0.7–3.1)
LYMPHOCYTES NFR BLD AUTO: 18.8 % (ref 19.6–45.3)
MCH RBC QN AUTO: 30.4 PG (ref 26.6–33)
MCHC RBC AUTO-ENTMCNC: 33.7 G/DL (ref 31.5–35.7)
MCV RBC AUTO: 90.3 FL (ref 79–97)
MONOCYTES # BLD AUTO: 0.42 10*3/MM3 (ref 0.1–0.9)
MONOCYTES NFR BLD AUTO: 4.8 % (ref 5–12)
NEUTROPHILS NFR BLD AUTO: 6.52 10*3/MM3 (ref 1.7–7)
NEUTROPHILS NFR BLD AUTO: 74.9 % (ref 42.7–76)
NRBC BLD AUTO-RTO: 0 /100 WBC (ref 0–0.2)
PLATELET # BLD AUTO: 259 10*3/MM3 (ref 140–450)
PMV BLD AUTO: 9.8 FL (ref 6–12)
RBC # BLD AUTO: 4.83 10*6/MM3 (ref 4.14–5.8)
WBC NRBC COR # BLD AUTO: 8.71 10*3/MM3 (ref 3.4–10.8)

## 2025-03-11 PROCEDURE — 1159F MED LIST DOCD IN RCRD: CPT | Performed by: INTERNAL MEDICINE

## 2025-03-11 PROCEDURE — 85652 RBC SED RATE AUTOMATED: CPT

## 2025-03-11 PROCEDURE — 36415 COLL VENOUS BLD VENIPUNCTURE: CPT

## 2025-03-11 PROCEDURE — 80053 COMPREHEN METABOLIC PANEL: CPT

## 2025-03-11 PROCEDURE — 99214 OFFICE O/P EST MOD 30 MIN: CPT | Performed by: INTERNAL MEDICINE

## 2025-03-11 PROCEDURE — 85025 COMPLETE CBC W/AUTO DIFF WBC: CPT

## 2025-03-11 PROCEDURE — 1160F RVW MEDS BY RX/DR IN RCRD: CPT | Performed by: INTERNAL MEDICINE

## 2025-03-11 PROCEDURE — 86140 C-REACTIVE PROTEIN: CPT

## 2025-03-11 RX ORDER — NAPROXEN 500 MG/1
500 TABLET ORAL 2 TIMES DAILY WITH MEALS
Qty: 60 TABLET | Refills: 6 | Status: SHIPPED | OUTPATIENT
Start: 2025-03-11

## 2025-03-11 NOTE — TELEPHONE ENCOUNTER
Prior authorization initiated by [facility].  Update will be provided when a determination has been received.     Medication: Humira  PA Submission Method: CMM  Case Number/CMM Key: [id number]

## 2025-03-12 LAB
ALBUMIN SERPL-MCNC: 4.4 G/DL (ref 3.5–5.2)
ALBUMIN/GLOB SERPL: 1.4 G/DL
ALP SERPL-CCNC: 106 U/L (ref 39–117)
ALT SERPL W P-5'-P-CCNC: 57 U/L (ref 1–41)
ANION GAP SERPL CALCULATED.3IONS-SCNC: 10 MMOL/L (ref 5–15)
AST SERPL-CCNC: 34 U/L (ref 1–40)
BILIRUB SERPL-MCNC: 0.8 MG/DL (ref 0–1.2)
BUN SERPL-MCNC: 16 MG/DL (ref 8–23)
BUN/CREAT SERPL: 14.8 (ref 7–25)
CALCIUM SPEC-SCNC: 9.4 MG/DL (ref 8.6–10.5)
CHLORIDE SERPL-SCNC: 103 MMOL/L (ref 98–107)
CO2 SERPL-SCNC: 26 MMOL/L (ref 22–29)
CREAT SERPL-MCNC: 1.08 MG/DL (ref 0.76–1.27)
CRP SERPL-MCNC: 0.48 MG/DL (ref 0–0.5)
EGFRCR SERPLBLD CKD-EPI 2021: 76.2 ML/MIN/1.73
ERYTHROCYTE [SEDIMENTATION RATE] IN BLOOD: 12 MM/HR (ref 0–20)
GLOBULIN UR ELPH-MCNC: 3.1 GM/DL
GLUCOSE SERPL-MCNC: 103 MG/DL (ref 65–99)
POTASSIUM SERPL-SCNC: 4.4 MMOL/L (ref 3.5–5.2)
PROT SERPL-MCNC: 7.5 G/DL (ref 6–8.5)
SODIUM SERPL-SCNC: 139 MMOL/L (ref 136–145)

## 2025-03-12 NOTE — TELEPHONE ENCOUNTER
PA request has been approved and pharmacy notified (Filling pharmacy will be notified by phone, fax, or submitted prescription)    Authorized Medication: Humira  Name of Insurance Approving PA: [employer]  Pharmacy PA Number: [id number]  PA Effective Dates: 1/1/25-3/12/26  Dispensing Pharmacy: [facility] -- patient may benefit from [facility] due to deductible

## 2025-03-13 ENCOUNTER — SPECIALTY PHARMACY (OUTPATIENT)
Facility: HOSPITAL | Age: 65
End: 2025-03-13
Payer: MEDICARE

## 2025-03-13 RX ORDER — ADALIMUMAB 40MG/0.4ML
1 KIT SUBCUTANEOUS
Qty: 2 EACH | Refills: 5 | Status: SHIPPED | OUTPATIENT
Start: 2025-03-13

## 2025-03-13 NOTE — PROGRESS NOTES
Specialty Pharmacy Patient Management Program  Rheumatology Initial Assessment     George Padron was referred by an Rheumatology provider to the Rheumatology Patient Management program offered by [facility] for Psoriatic Arthritis on 03/13/25.  An initial outreach was conducted, including assessment of therapy appropriateness and specialty medication education for Humira. The patient was introduced to services offered by [facility], including: regular assessments, refill coordination, curbside pick-up or mail order delivery options, prior authorization maintenance, and financial assistance programs as applicable. The patient was also provided with contact information for the pharmacy team.     Insurance Coverage & Financial Support  [employer] Medicare     Relevant Past Medical History and Comorbidities  Relevant medical history and concomitant health conditions were discussed with the patient. The patient's chart has been reviewed for relevant past medical history and comorbid conditions and updated as necessary.  Past Medical History:   Diagnosis Date    Psoriasis      Social History     Socioeconomic History    Marital status:    Tobacco Use    Smoking status: Never     Passive exposure: Past    Smokeless tobacco: Never   Vaping Use    Vaping status: Never Used   Substance and Sexual Activity    Alcohol use: Yes     Comment: OCC    Drug use: Defer    Sexual activity: Defer       Problem list reviewed by Shahzad Clay, Inez on 3/13/2025 at 12:41 PM    Allergies  Known allergies and reactions were discussed with the patient. The patient's chart has been reviewed for  allergy information and updated as necessary.   Allergies   Allergen Reactions    Codeine Provider Review Needed       Allergies reviewed by Shahzad Clay, Inez on 3/13/2025 at 12:41 PM    Relevant Laboratory Values  Relevant laboratory values were discussed with the patient. The  "following specialty medication dose adjustment(s) are recommended: n/a    No results found for: \"HGBA1C\"  Lab Results   Component Value Date    GLUCOSE 103 (H) 03/11/2025    CALCIUM 9.4 03/11/2025     03/11/2025    K 4.4 03/11/2025    CO2 26.0 03/11/2025     03/11/2025    BUN 16 03/11/2025    CREATININE 1.08 03/11/2025    BCR 14.8 03/11/2025    ANIONGAP 10.0 03/11/2025     No results found for: \"CHOL\", \"CHLPL\", \"TRIG\", \"HDL\", \"LDL\", \"LDLDIRECT\"      Current Medication List  This medication list has been reviewed with the patient and evaluated for any interactions or necessary modifications/recommendations, and updated to include all prescription medications, OTC medications, and supplements the patient is currently taking.  This list reflects what is contained in the patient's profile, which has also been marked as reviewed to communicate to other providers it is the most up to date version of the patient's current medication therapy.     Current Outpatient Medications:     Adalimumab (Humira, 2 Pen,) 40 MG/0.4ML Auto-injector Kit, Inject 1 Pen under the skin into the appropriate area as directed Every 14 (Fourteen) Days., Disp: 2 each, Rfl: 5    BIOTIN PO, Take 1 capsule by mouth Daily., Disp: , Rfl:     Cholecalciferol (Vitamin D3) 50 MCG (2000 UT) tablet, Take 1 tablet by mouth Daily., Disp: , Rfl:     famotidine (PEPCID) 40 MG tablet, Take 1 tablet by mouth At Night As Needed for Heartburn., Disp: , Rfl:     MILK THISTLE PO, Take 1 capsule by mouth Daily., Disp: , Rfl:     naproxen (Naprosyn) 500 MG tablet, Take 1 tablet by mouth 2 (Two) Times a Day With Meals. Take 1 tablet (500 mg) by oral route 2 times every day with food, Disp: 60 tablet, Rfl: 6    Omeprazole Magnesium (PRILOSEC PO), Take 20 mg by mouth Daily., Disp: , Rfl:     predniSONE (DELTASONE) 1 MG tablet, Take 3 tablets by mouth Daily., Disp: 270 tablet, Rfl: 0    Medicines reviewed by Shahzad Clay, PharmD on 3/13/2025 at 12:41 " PM    Drug Interactions  N/a    Initial Education Provided for Specialty Medication  The patient has been provided with the following education and any applicable administration techniques (i.e. self-injection) have been demonstrated for the therapies indicated. All questions and concerns have been addressed prior to the patient receiving the medication, and the patient has verbalized comprehension of the education and any materials provided. Additional patient education shall be provided and documented upon request by the patient, provider, or payer.       Humira (adalimumab)         Medication Expectations   Why am I taking this medication? You are taking this medication for Crohn's disease, ulcerative colitis, rheumatoid, ankylosing spondylitis or psoriatic arthritis.   What should I expect while on this medication? You should expect a decrease in the frequency and severity of symptoms.   How does the medication work? Adalimumab binds to TNF-alpha therefore interfering with binding to a TNFa receptor site.   How long will I be on this medication for? The amount of time you will be on this medication will be determined by your doctor and your response to the medication.    How do I take this medication? Take as directed on your prescription label.  This medication is a subcutaneous injection given in the fatty part of the skin on the top of the thigh or stomach area. May leave at room temperature for 15-30 minutes prior to injection.   What are some possible side effects? Injection site reactions and hypersensitivity reactions, headache, signs of a common cold, stomach pain, upset stomach, or back pain.   What happens if I miss a dose? If you miss a dose, take it as soon as you remember. If it is close to the time for your next dose, skip the missed dose and go back to your normal time.               Medication Safety   What are things I should warn my doctor immediately about? Allergic reaction such has hives or  trouble breathing. If you develop symptoms such as a cough that does not go away, weight loss, changes in how often you urinate, numbness or tingling in extremities, rash on cheeks or other body parts, unusual bleeding or bruising.   What are things that I should be cautious of? Injection site reaction, back pain, and headache. You may have more chance of getting an infection.  Wash your hands often and stay away from people with infections, colds, or flu.   What are some medications that can interact with this one? Immunosuppressants and vaccines.            Medication Storage/Handling   How should I handle this medication? Keep this medication our of reach of pets/children in original container.  Store in the original container to protect from light. Do not inject where the skin is tender, bruised, red, hard, or affected by psoriasis.  Rotate injection sites.   How does this medication need to be stored? Store in refrigerator and keep dry. If needed, you may store at room temperature for up to 14 days, but do not return to the refrigerator after it has reached room temperature.    How should I dispose of this medication? You can dispose of the empty syringe in a sharps container, and if this is not available you may use an empty hard plastic container such as a milk jug or tide container.            Resources/Support   How can I remind myself to take this medication? You can download a reminder reema on your phone or use a calandar  to help with your injection.   Is financial support available?  Yes, [employer] can provide co-pay cards if you have commercial insurance or patient assistance if you have Medicare or no insurance.    Which vaccines are recommended for me? Talk to your doctor about these vaccines: Flu, Coronavirus (COVID-19), Pneumococcal (pneumonia), Tdap, Hepatitis B, Zoster (shingles).              Adherence and Self-Administration  Adherence related to the patient's specialty therapy was discussed with  the patient. The Adherence segment of this outreach has been reviewed and updated.     Is there a concern with patient's ability to self administer the medication correctly and without issue?: No  Were any potential barriers to adherence identified during the initial assessment or patient education?: No  Are there any concerns regarding the patient's understanding of the importance of medication adherence?: No  Methods for Supporting Patient Adherence and/or Self-Administration: n/a    Open Medication Therapy Problems  No medication therapy recommendations to display    Goals of Therapy  Goals related to the patient's specialty therapy were discussed with the patient. The Patient Goals segment of this outreach has been reviewed and updated.   Goals Addressed Today        Specialty Pharmacy General Goal      Reduce number of flares and pain score.               Reassessment Plan & Follow-Up  1. Medication Therapy Changes: Humira 40mg/0.4ml subq every 14 days  2. Related Plans, Therapy Recommendations, or Therapy Problems to Be Addressed: Patient previously on Humira every other week and responded well.  Patient restarting Humira.  Patient does not have any questions or concerns about medication.  Advised patient to call direct line with any further questions.   3. Pharmacist to perform regular assessments no more than (6) months from the previous assessment.  4. Care Coordinator to set up future refill outreaches, coordinate prescription delivery, and escalate clinical questions to pharmacist.  5. Welcome information and patient satisfaction survey to be sent by specialty pharmacy team with patient's initial fill.    Attestation  Therapeutic appropriateness: Appropriate   I attest the patient was actively involved in and has agreed to the above plan of care. If the prescribed therapy is at any point deemed not appropriate based on the current or future assessments, a consultation will be initiated with the patient's  specialty care provider to determine the best course of action. The revised plan of therapy will be documented along with any required assessments and/or additional patient education provided.     Shahzad Clay, PharmD  Clinical Specialty Pharmacist, Rheumatology  3/13/2025  12:44 EDT

## 2025-03-13 NOTE — PROGRESS NOTES
Specialty Pharmacy Patient Management Program  Per Protocol Prescription Order/Refill     Patient currently fills medications at [facility] and is enrolled in an Rheumatology Patient Management Program.     Requested Prescriptions     Signed Prescriptions Disp Refills    Adalimumab (Humira, 2 Pen,) 40 MG/0.4ML Auto-injector Kit 2 each 5     Sig: Inject 1 Pen under the skin into the appropriate area as directed Every 14 (Fourteen) Days.     Prescription orders above were sent to the pharmacy per Collaborative Care Agreement Protocol.

## 2025-04-08 ENCOUNTER — SPECIALTY PHARMACY (OUTPATIENT)
Age: 65
End: 2025-04-08
Payer: MEDICARE

## 2025-04-08 NOTE — PROGRESS NOTES
Specialty Pharmacy Patient Management Program  Refill Outreach     George was contacted today regarding refills of their medication(s).Humira    Refill Questions      Flowsheet Row Most Recent Value   Changes to allergies? No   Changes to medications? No   New conditions or infections since last clinic visit No   Unplanned office visit, urgent care, ED, or hospital admission in the last 4 weeks  No   How does patient/caregiver feel medication is working? Good   Financial problems or insurance changes  No   Since the previous refill, were any specialty medication doses or scheduled injections missed or delayed?  No   Does this patient require a clinical escalation to a pharmacist? No            Delivery Questions      Flowsheet Row Most Recent Value   Delivery method UPS   Delivery address verified with patient/caregiver? Yes   Delivery address Home   Number of medications in delivery 1   Medication(s) being filled and delivered Adalimumab (Humira (2 Pen))   Doses left of specialty medications 0   Copay verified? Yes   Copay amount 0   Copay form of payment No copayment ($0)   Delivery Date Selection 04/10/25   Signature Required No                 Follow-up: 21 day(s)     Reanna Stratton, Pharmacy Technician  4/8/2025  12:42 EDT

## 2025-05-01 ENCOUNTER — SPECIALTY PHARMACY (OUTPATIENT)
Age: 65
End: 2025-05-01
Payer: MEDICARE

## 2025-05-01 NOTE — PROGRESS NOTES
Specialty Pharmacy Patient Management Program  Refill Outreach     George was contacted today regarding refills of their medication(s). HUMIRA     Refill Questions      Flowsheet Row Most Recent Value   Changes to allergies? No   Changes to medications? No   New conditions or infections since last clinic visit No   Unplanned office visit, urgent care, ED, or hospital admission in the last 4 weeks  No   How does patient/caregiver feel medication is working? Good   Financial problems or insurance changes  No   Since the previous refill, were any specialty medication doses or scheduled injections missed or delayed?  No   Does this patient require a clinical escalation to a pharmacist? No            Delivery Questions      Flowsheet Row Most Recent Value   Delivery method UPS   Delivery address verified with patient/caregiver? Yes   Number of medications in delivery 1   Medication(s) being filled and delivered Adalimumab (Humira (2 Pen))   Doses left of specialty medications 0   Copay verified? Yes   Copay amount 0   Copay form of payment No copayment ($0)   Delivery Date Selection 05/06/25   Signature Required No   Do you consent to receive electronic handouts?  No                 Follow-up: 21 day(s)     Reanna Stratton, Pharmacy Technician  5/1/2025  11:13 EDT     evaluation prior to sedated cardiac MRI MRA

## 2025-05-06 DIAGNOSIS — L40.50 PSORIATIC ARTHRITIS: ICD-10-CM

## 2025-05-06 DIAGNOSIS — Z79.52 LONG TERM CURRENT USE OF SYSTEMIC STEROIDS: ICD-10-CM

## 2025-05-06 RX ORDER — PREDNISONE 1 MG/1
3 TABLET ORAL DAILY
Qty: 270 TABLET | Refills: 0 | Status: SHIPPED | OUTPATIENT
Start: 2025-05-06

## 2025-05-28 ENCOUNTER — SPECIALTY PHARMACY (OUTPATIENT)
Age: 65
End: 2025-05-28
Payer: MEDICARE

## 2025-05-28 NOTE — PROGRESS NOTES
Specialty Pharmacy Patient Management Program  Refill Outreach     George was contacted today regarding refills of their medication(s). HUMIRA    Refill Questions      Flowsheet Row Most Recent Value   Changes to allergies? No   Changes to medications? No   New conditions or infections since last clinic visit No   Unplanned office visit, urgent care, ED, or hospital admission in the last 4 weeks  No   How does patient/caregiver feel medication is working? Very good   Financial problems or insurance changes  No   Since the previous refill, were any specialty medication doses or scheduled injections missed or delayed?  No   Does this patient require a clinical escalation to a pharmacist? No            Delivery Questions      Flowsheet Row Most Recent Value   Delivery method UPS   Delivery address verified with patient/caregiver? Yes   Delivery address Home   Number of medications in delivery 1   Medication(s) being filled and delivered Adalimumab (Humira (2 Pen))   Doses left of specialty medications 0   Copay verified? Yes   Copay amount 0   Copay form of payment No copayment ($0)   Delivery Date Selection 05/29/25   Signature Required No                 Follow-up: 21 day(s)     Reanna Stratton, Pharmacy Technician  5/28/2025  08:53 EDT

## 2025-06-23 ENCOUNTER — SPECIALTY PHARMACY (OUTPATIENT)
Age: 65
End: 2025-06-23
Payer: MEDICARE

## 2025-06-23 NOTE — PROGRESS NOTES
Specialty Pharmacy Patient Management Program  Refill Outreach     George was contacted today regarding refills of their medication(s). HUMIRA    Refill Questions      Flowsheet Row Most Recent Value   Changes to allergies? No   Changes to medications? No   New conditions or infections since last clinic visit No   Unplanned office visit, urgent care, ED, or hospital admission in the last 4 weeks  No   How does patient/caregiver feel medication is working? Very good   Financial problems or insurance changes  No   Since the previous refill, were any specialty medication doses or scheduled injections missed or delayed?  No   Does this patient require a clinical escalation to a pharmacist? No            Delivery Questions      Flowsheet Row Most Recent Value   Delivery method UPS   Delivery address verified with patient/caregiver? Yes   Delivery address Home   Number of medications in delivery 1   Medication(s) being filled and delivered Adalimumab (Humira (2 Pen))   Doses left of specialty medications 0   Copay verified? Yes   Copay amount 0   Copay form of payment No copayment ($0)   Delivery Date Selection 06/25/25   Signature Required Yes   Do you consent to receive electronic handouts?  Yes                 Follow-up: 21 day(s)     Reanna Stratton, Pharmacy Technician  6/23/2025  13:36 EDT

## 2025-07-15 ENCOUNTER — SPECIALTY PHARMACY (OUTPATIENT)
Age: 65
End: 2025-07-15
Payer: MEDICARE

## 2025-07-15 NOTE — PROGRESS NOTES
Specialty Pharmacy Patient Management Program  Refill Outreach     George was contacted today regarding refills of their medication(s).    Refill Questions      Flowsheet Row Most Recent Value   Changes to allergies? No   Changes to medications? No   New conditions or infections since last clinic visit Yes   If yes, please describe  kidney stone   Unplanned office visit, urgent care, ED, or hospital admission in the last 4 weeks  Yes  [had kidney stone removed]   How does patient/caregiver feel medication is working? Fair  [80 percent affective]   Financial problems or insurance changes  No   Since the previous refill, were any specialty medication doses or scheduled injections missed or delayed?  No   Does this patient require a clinical escalation to a pharmacist? No            Delivery Questions      Flowsheet Row Most Recent Value   Delivery method UPS   Delivery address verified with patient/caregiver? Yes   Delivery address Home   Number of medications in delivery 1   Medication(s) being filled and delivered Adalimumab (Humira (2 Pen))   Doses left of specialty medications 1   Copay verified? Yes   Copay amount 0   Copay form of payment No copayment ($0)   Delivery Date Selection 07/16/25   Signature Required No   Do you consent to receive electronic handouts?  No                 Follow-up: 21 day(s)     Saida Do, Pharmacy Technician  7/15/2025  11:35 EDT

## 2025-07-17 NOTE — ASSESSMENT & PLAN NOTE
Orders:    predniSONE (DELTASONE) 1 MG tablet; Take 3 tablets by mouth Daily.    C-reactive Protein    Sedimentation Rate

## 2025-07-17 NOTE — PROGRESS NOTES
"                    Office Follow Up      Date: 07/29/2025   Patient Name: George Padron  MRN: [id number]  YOB: 1960    Referring Physician: No ref. provider found     Chief Complaint: Psoriatic arthritis      History of Present Illness: George Padron is a 64 y.o. male who is here today for follow up on psoriatic arthritis  Restarted Humira.   Feels much better.   \"80% better\". No swelling but some CMC joint pain. Ankles sore with walking.    Continues on 3 mg of prednisone.   No current skin lesions.   He takes naproxen PRN, typically just once daily.  He has known cervical radicular disease.     Most of his pain is from his neck and back and is degenerative in nature. He has tried PT and injections. He was told he could try cervical surgery but has declined.  Pain scale: 6/10. EMS is 2 hrs.    The symptoms are occasional.  The problem is improving. The primary symptoms reported include: stiffness. The following symptoms are not reported:  pain, swelling and functional limitation. The patient's assessment of treatment is: helping greatly. The patient is not experiencing side effects. The locations affected since last visit are neck, low back and bilateral shoulder. Associated symptoms include AM stiffness (all day) and inactivity gelling. Pertinent negatives include fever, fatigue, change in vision, sicca complaints, mouth sores/lesions, skin lesion(s), chest pain, changing cough, edema, joint swelling and abdominal pain.  Had kidney stone removed in the interim.     Subjective   Review of Systems: Review of Systems   Musculoskeletal:  Positive for arthralgias, back pain, gait problem, joint swelling, myalgias, neck pain and neck stiffness.        Medications:   Current Outpatient Medications:     Adalimumab (Humira, 2 Pen,) 40 MG/0.4ML Auto-injector Kit, Inject 1 Pen under the skin into the appropriate area as directed Every 14 (Fourteen) Days., Disp: 2 each, Rfl: 5    BIOTIN PO, Take 1 capsule by " "mouth Daily., Disp: , Rfl:     Cholecalciferol (Vitamin D3) 50 MCG (2000 UT) tablet, Take 1 tablet by mouth Daily., Disp: , Rfl:     famotidine (PEPCID) 40 MG tablet, Take 1 tablet by mouth At Night As Needed for Heartburn., Disp: , Rfl:     MILK THISTLE PO, Take 1 capsule by mouth Daily., Disp: , Rfl:     naproxen (Naprosyn) 500 MG tablet, Take 1 tablet by mouth 2 (Two) Times a Day With Meals. Take 1 tablet (500 mg) by oral route 2 times every day with food, Disp: 60 tablet, Rfl: 6    Omeprazole Magnesium (PRILOSEC PO), Take 20 mg by mouth Daily., Disp: , Rfl:     predniSONE (DELTASONE) 1 MG tablet, Take 3 tablets by mouth Daily., Disp: 270 tablet, Rfl: 0    Xiidra 5 % ophthalmic solution, , Disp: , Rfl:     Allergies:   Allergies   Allergen Reactions    Codeine Provider Review Needed       I have reviewed and updated the patient's chief complaint, history of present illness, review of systems, past medical history, surgical history, family history, social history, medications and allergy list as appropriate.     Objective    Vital Signs:   Vitals:    07/29/25 1018   BP: 140/92   Pulse: 56   Temp: 97.2 °F (36.2 °C)   TempSrc: Temporal   Weight: 91.6 kg (202 lb)   Height: 182.9 cm (72.01\")   PainSc: 0-No pain       Body mass index is 27.39 kg/m².    Physical Exam:  GEN.: The patient is pleasant, cooperative, well-developed and healthy-appearing.  The patient is in no obvious distress and is alert and oriented ×3.  HEENT: Normocephalic and atraumatic.  No alopecia.  No facial rash.  Sclera and conjunctiva are clear.  Pupils are equal and reactive.  Fundi are benign.  Dentition is in fair repair.  There are no oral or nasal lesions seen.  NECK: Supple without adenopathy or thyromegaly.  No masses are felt.  LUNGS: Clear to auscultation and percussion.  Effort is normal.  CARDIAC: Normal S1 and S2 with a regular rate and rhythm without murmur, gallop, or rub.  ABDOMEN: Not examined.  EXTREMITIES: There is no cyanosis, " clubbing, or edema.   SKIN: Warm and dry without rashes, telangiectasias, vasculitic lesions, psoriatic lesions, nail pits, or nodules.  JOINTS: There is full range of motion of the shoulders, elbows, wrists, and hands without soft tissue swelling, synovitis, or deformities. There is squaring of the 1st CMC bilat.  There is no soft tissue swelling or synovitis in the hands.  Hips have good flexion and internal and external rotation with pain. He has back pain with full flexion of R hip and with full external rotation.   Knees have no palpable effusions.  Ankles have no soft tissue swelling, synovitis, or significant deformities.    BACK: Straight without scoliosis.  NEUROLOGIC:   Gait and station are normal.  Joint Exam 07/29/2025     The following joints were examined and normal:   Left Glenohumeral, Right Glenohumeral, Left Elbow, Right Elbow, Left Wrist, Right Wrist, Left MCP 1, Right MCP 1, Left MCP 2, Right MCP 2, Left MCP 3, Right MCP 3, Left MCP 4, Right MCP 4, Left MCP 5, Right MCP 5, Left IP (thumb), Right IP (thumb), Left PIP 2 (finger), Right PIP 2 (finger), Left PIP 3 (finger), Right PIP 3 (finger), Left PIP 4 (finger), Right PIP 4 (finger), Left PIP 5 (finger), Right PIP 5 (finger), Left Knee, Right Knee       Patient Global: 70 / 100  Provider Global: 10 / 100      Assessment / Plan      Assessment & Plan  Psoriatic arthritis    Orders:    predniSONE (DELTASONE) 1 MG tablet; Take 3 tablets by mouth Daily.    C-reactive Protein    Sedimentation Rate    Long term current use of systemic steroids    Orders:    predniSONE (DELTASONE) 1 MG tablet; Take 3 tablets by mouth Daily.    Psoriasis         Immunodeficiency due to treatment with immunosuppressive medication    Orders:    Comprehensive Metabolic Panel    CBC Auto Differential    Chronic pain of both shoulders         Chronic low back pain, unspecified back pain laterality, unspecified whether sciatica present         Neck pain         Fatigue,  unspecified type         Long term (current) use of non-steroidal anti-inflammatories (nsaid)          Psoriatic arthritis  Onset of pain 2010.  RF, CCP, NAUN, and HLA B27 negative. Leflunomide no help. Sulfasalazine 2010.  Sulfasalazine was discontinued and methotrexate started 2010. Enbrel added 2017. Chronic steroid therapy.  First seen at [facility] 2017.   Humira started 2018 after stopping Enbrel. MTX stopped 2019. Humira stopped 6/2023 (cost).  Humira restarted 2025.    PsA is much better after restarting Humira.  Tender joint count is 0 swollen joint count is 0 , physician global is 0, visual analog pain scale is 6, pt global is 7.  Pain scores are driven by his neck pain.  He has improved greatly.  Continue Humira.  Continue 3 mg of prednisone.   Labs today  RTC in 3 months         Psoriasis  Diagnosed by bx    Doing well         Immunodeficiency due to treatment with immunosuppressive medication  Enbrel therapy started February 2017-stopped 2018  Humira started 2018- stopped on own 6/2023.  Restarted 2025.    No significant infections in interim.          Long term current use of systemic steroids  Prednisone 10 mg q. a.m. started 2010.     Has since weaned to 3 mg daily which he continues         Chronic pain of both shoulders  He has a known history of rotator cuff tear bilaterally. NCS shows cervical radiculopathy. PT helped. I think neck pain is associated with DDD. Injections gave some relief but short term only and symptoms come and go         Chronic low back pain, unspecified back pain laterality, unspecified whether sciatica present  PT/stretching has been helpful in past  Pain in LS area on R, R groin, and down to foot with paresthesia. Worse with back movement.     Symptoms still come and go         Neck pain  Comes and goes. Known DDD with cervical radiculitis in the past. PT and injections have not helped much. He declined surgical options discussed with NS            Long term (current) use of  non-steroidal anti-inflammatories (nsaid)  Risks of nonsteroidal anti-inflammatory drugs discussed including GI upset, GI bleeding, renal and hepatic risk, and the risks of cardiovascular disease.  Warned not to take with other NSAIDs including over-the-counter NSAIDs.      Fatigue, unspecified type      Follow Up:   Return in about 3 months (around 10/29/2025).        George Olivier MD  [facility]

## 2025-07-29 ENCOUNTER — OFFICE VISIT (OUTPATIENT)
Age: 65
End: 2025-07-29
Payer: MEDICARE

## 2025-07-29 VITALS
HEIGHT: 72 IN | HEART RATE: 56 BPM | SYSTOLIC BLOOD PRESSURE: 140 MMHG | DIASTOLIC BLOOD PRESSURE: 92 MMHG | WEIGHT: 202 LBS | BODY MASS INDEX: 27.36 KG/M2 | TEMPERATURE: 97.2 F

## 2025-07-29 DIAGNOSIS — Z79.60 IMMUNODEFICIENCY DUE TO TREATMENT WITH IMMUNOSUPPRESSIVE MEDICATION: ICD-10-CM

## 2025-07-29 DIAGNOSIS — M54.2 NECK PAIN: ICD-10-CM

## 2025-07-29 DIAGNOSIS — Z79.1 LONG TERM (CURRENT) USE OF NON-STEROIDAL ANTI-INFLAMMATORIES (NSAID): ICD-10-CM

## 2025-07-29 DIAGNOSIS — L40.9 PSORIASIS: ICD-10-CM

## 2025-07-29 DIAGNOSIS — M25.511 CHRONIC PAIN OF BOTH SHOULDERS: ICD-10-CM

## 2025-07-29 DIAGNOSIS — M25.512 CHRONIC PAIN OF BOTH SHOULDERS: ICD-10-CM

## 2025-07-29 DIAGNOSIS — D84.821 IMMUNODEFICIENCY DUE TO TREATMENT WITH IMMUNOSUPPRESSIVE MEDICATION: ICD-10-CM

## 2025-07-29 DIAGNOSIS — G89.29 CHRONIC PAIN OF BOTH SHOULDERS: ICD-10-CM

## 2025-07-29 DIAGNOSIS — L40.50 PSORIATIC ARTHRITIS: Primary | ICD-10-CM

## 2025-07-29 DIAGNOSIS — G89.29 CHRONIC LOW BACK PAIN, UNSPECIFIED BACK PAIN LATERALITY, UNSPECIFIED WHETHER SCIATICA PRESENT: ICD-10-CM

## 2025-07-29 DIAGNOSIS — M54.50 CHRONIC LOW BACK PAIN, UNSPECIFIED BACK PAIN LATERALITY, UNSPECIFIED WHETHER SCIATICA PRESENT: ICD-10-CM

## 2025-07-29 DIAGNOSIS — T45.1X5A IMMUNODEFICIENCY DUE TO TREATMENT WITH IMMUNOSUPPRESSIVE MEDICATION: ICD-10-CM

## 2025-07-29 DIAGNOSIS — Z79.52 LONG TERM CURRENT USE OF SYSTEMIC STEROIDS: ICD-10-CM

## 2025-07-29 DIAGNOSIS — R53.83 FATIGUE, UNSPECIFIED TYPE: ICD-10-CM

## 2025-07-29 RX ORDER — PREDNISONE 1 MG/1
3 TABLET ORAL DAILY
Qty: 270 TABLET | Refills: 0 | Status: SHIPPED | OUTPATIENT
Start: 2025-07-29

## 2025-07-29 RX ORDER — ADALIMUMAB 40MG/0.4ML
1 KIT SUBCUTANEOUS
Qty: 2 EACH | Refills: 5 | Status: SHIPPED | OUTPATIENT
Start: 2025-07-29

## 2025-07-29 RX ORDER — NAPROXEN 500 MG/1
500 TABLET ORAL 2 TIMES DAILY WITH MEALS
Qty: 60 TABLET | Refills: 6 | Status: SHIPPED | OUTPATIENT
Start: 2025-07-29

## 2025-07-29 RX ORDER — LIFITEGRAST 50 MG/ML
SOLUTION/ DROPS OPHTHALMIC
COMMUNITY
Start: 2025-07-18

## 2025-07-30 ENCOUNTER — RESULTS FOLLOW-UP (OUTPATIENT)
Age: 65
End: 2025-07-30
Payer: MEDICARE

## 2025-07-30 LAB
ALBUMIN SERPL-MCNC: 4.6 G/DL (ref 3.5–5.2)
ALBUMIN/GLOB SERPL: 1.8 G/DL
ALP SERPL-CCNC: 82 U/L (ref 39–117)
ALT SERPL-CCNC: 28 U/L (ref 1–41)
AST SERPL-CCNC: 27 U/L (ref 1–40)
BASOPHILS # BLD AUTO: 0.06 10*3/MM3 (ref 0–0.2)
BASOPHILS NFR BLD AUTO: 0.9 % (ref 0–1.5)
BILIRUB SERPL-MCNC: 1.1 MG/DL (ref 0–1.2)
BUN SERPL-MCNC: 16 MG/DL (ref 8–23)
BUN/CREAT SERPL: 16.7 (ref 7–25)
CALCIUM SERPL-MCNC: 9.3 MG/DL (ref 8.6–10.5)
CHLORIDE SERPL-SCNC: 102 MMOL/L (ref 98–107)
CO2 SERPL-SCNC: 27.9 MMOL/L (ref 22–29)
CREAT SERPL-MCNC: 0.96 MG/DL (ref 0.76–1.27)
CRP SERPL-MCNC: <0.3 MG/DL (ref 0–0.5)
EGFRCR SERPLBLD CKD-EPI 2021: 87.7 ML/MIN/1.73
EOSINOPHIL # BLD AUTO: 0.15 10*3/MM3 (ref 0–0.4)
EOSINOPHIL NFR BLD AUTO: 2.3 % (ref 0.3–6.2)
ERYTHROCYTE [DISTWIDTH] IN BLOOD BY AUTOMATED COUNT: 13.8 % (ref 12.3–15.4)
ERYTHROCYTE [SEDIMENTATION RATE] IN BLOOD BY WESTERGREN METHOD: <1 MM/HR (ref 0–20)
GLOBULIN SER CALC-MCNC: 2.6 GM/DL
GLUCOSE SERPL-MCNC: 71 MG/DL (ref 65–99)
HCT VFR BLD AUTO: 44.9 % (ref 37.5–51)
HGB BLD-MCNC: 15.5 G/DL (ref 13–17.7)
IMM GRANULOCYTES # BLD AUTO: 0.01 10*3/MM3 (ref 0–0.05)
IMM GRANULOCYTES NFR BLD AUTO: 0.2 % (ref 0–0.5)
LYMPHOCYTES # BLD AUTO: 2.41 10*3/MM3 (ref 0.7–3.1)
LYMPHOCYTES NFR BLD AUTO: 36.6 % (ref 19.6–45.3)
MCH RBC QN AUTO: 32 PG (ref 26.6–33)
MCHC RBC AUTO-ENTMCNC: 34.5 G/DL (ref 31.5–35.7)
MCV RBC AUTO: 92.8 FL (ref 79–97)
MONOCYTES # BLD AUTO: 0.6 10*3/MM3 (ref 0.1–0.9)
MONOCYTES NFR BLD AUTO: 9.1 % (ref 5–12)
NEUTROPHILS # BLD AUTO: 3.36 10*3/MM3 (ref 1.7–7)
NEUTROPHILS NFR BLD AUTO: 50.9 % (ref 42.7–76)
NRBC BLD AUTO-RTO: 0 /100 WBC (ref 0–0.2)
PLATELET # BLD AUTO: 234 10*3/MM3 (ref 140–450)
POTASSIUM SERPL-SCNC: 4 MMOL/L (ref 3.5–5.2)
PROT SERPL-MCNC: 7.2 G/DL (ref 6–8.5)
RBC # BLD AUTO: 4.84 10*6/MM3 (ref 4.14–5.8)
SODIUM SERPL-SCNC: 141 MMOL/L (ref 136–145)
WBC # BLD AUTO: 6.59 10*3/MM3 (ref 3.4–10.8)

## 2025-08-04 ENCOUNTER — SPECIALTY PHARMACY (OUTPATIENT)
Age: 65
End: 2025-08-04
Payer: MEDICARE

## 2025-08-06 ENCOUNTER — SPECIALTY PHARMACY (OUTPATIENT)
Dept: GENERAL RADIOLOGY | Facility: HOSPITAL | Age: 65
End: 2025-08-06
Payer: MEDICARE

## 2025-08-06 RX ORDER — ADALIMUMAB 40MG/0.4ML
1 KIT SUBCUTANEOUS
Qty: 2 EACH | Refills: 5 | Status: SHIPPED | OUTPATIENT
Start: 2025-08-06

## 2025-08-07 ENCOUNTER — SPECIALTY PHARMACY (OUTPATIENT)
Age: 65
End: 2025-08-07
Payer: MEDICARE